# Patient Record
(demographics unavailable — no encounter records)

---

## 2024-12-21 NOTE — ED.PDOC
SOB-HPI


HPI Comments


HPI:


Poor Historian.


65-year-old female presents to the emergency department for evaluation of one-

week history of cough congestion and shortness of breath.  Patient has history 

of COPD oxygen dependent 2 L nasal cannula at home.  Patient states that she is 

not feeling well and having associated generalized body aches.  She took some 

Tylenol prior to arrival.  Denies any other acute symptoms.  Patient states 

having some chest discomfort from coughing frequently.





Initial Vital Signs:


Temp : 98.6F


BP: 157/101


HR: 129


RR: 18


SpO2: 87% on RA





Past Medical History:


Breast Cancer, Asthma, COPD, HTN, DM, GERD, HLD, PE





Past Surgical History:


, Tonsillectomy, Cholecystectomy





Social History:


Denies smoking, ETOH, or drug use.





Allergies:


Carisoprodol, Ceftriaxone, Oxycodone, Penicillins








REVIEW OF SYSTEMS:





CONSTITUTIONAL:





Denies acute: fever, diaphoresis, chills, 





HEAD:


Denies acute:  headache, photophobia





Eyes:


Denies acute: Double vision, vision loss, eye pain, eye discharge.





EARS: 


Denies acute: tinnitus, hearing loss, ear discharge, ear pain,





THROAT: 


Denies acute: sore throat, swelling, difficulty swallowing , pain with 

swallowing, change in  voice.





NECK:


Denies acute: neck pain, neck swelling, stiff neck.





HEART:


Denies acute : chest pain, palpitations,


 


LUNGS:


Denies acute:       hemoptysis





ABDOMEN:


Denies acute: abdominal pain, Nausea, Vomiting, diarrhea, melena , hematemesis, 

hematochezia





SKIN:


Denies acute: rash, redness, lesions, itchiness. 





EXTREMITIES:


Denies acute: calf pain, numbness, tingling, weakness, denies pain in extremity.


Denies acute: Low back pain. 





Neuro:


Denies acute: focal neurological deficit, motor or sensory focal neurological 

deficit, tremors, seizure like activity, confusion, dizziness, change in mental 

status, loss of bowel or bladder function, cauda equina like symptoms.  





: 


Denies acute: dysuria, hematuria, flank pain, increase in urinary frequency.











PSYCH: 


Denies acute: hallucination, suicidal ideation, homicidal ideation.





FEMALE: 


Denies acute:  abnormal vaginal bleeding, foul odor, unusual discharge.  








PHYSICAL EXAM:


General: no acute distress, awake and alert.





Head: normocephalic, atraumatic.





Neck: 


supple, trachea is midline, no swelling. 








Throat:  Normal phonation.








Eyes:, no erythema, no purulent discharge, no proptosis, no icterus.


   


 


Heart:  regular rate, regular rhythm, no significant murmur appreciated.





Lungs:  Mild-to-moderate   respiratory distress, 


   Able to speak in full sentences. 


Noted bilateral wheezing and rhonchi, no crackles.  No stridors


    





Abdomen: non tender to palpation, non distended, soft, no guarding, no rebound, 

+ bowel sounds.











Neuro: Awake, Alert, oriented to name, self, situation, follows commands


 GCS=15.  


Speech is normal.





   


Skin: no petechia, no purpura, no cyanosis, non-pale, not jaundice. 





Lower extremities:  --no - Pitting edema


no deformity, no focal swelling, no calf TTP. 





Makes eye contact.





moves all four extremities.  


   





Face: no apparent facial droop.





 


Ambulating in the ED independently.


Time Seen by MD:  14:41


Primary Care Provider:  [id number]


Reviewed notes:  Nurses Notes, Allergies


Information Source:  Patient





Past Medical History


PAST MEDICAL HISTORY:  Asthma, Cancer, COPD, DM, GERD, High Lipids, HTN, PE


Surgical History:  Cholecystectomy, , Tonsillectomy


GYN History:  No Pertinent GYN History





Family History


Family History:  Reviewed,noncontributory to illness





Social History


Smoker:  Non-Smoker


Alcohol:  Denies ETOH Use


Drugs:  Denies Drug Use


Lives In:  Home





X-Ray, Labs, Meds, VS





                                   Vital Signs








  Date Time  Temp Pulse Resp B/P (MAP) Pulse Ox O2 Delivery O2 Flow Rate FiO2


 


24 19:45 98.9 110 20 154/96 (115) 96   





 98.9       


 


24 18:15  110 20  93 Nasal Cannula 2.0 


 


24 18:15 98.9 110 20 144/84 (104) 96   





 98.9       


 


24 18:11 98.4       


 


24 17:15 99.4 118 20 154/96 (115) 95   





 99.4       


 


24 15:13   26  92 Nasal Cannula* 2 28


 


24 15:11  122      


 


24 14:55 98.6 129 18 157/101 (119) 87   








                                       Lab








Test


 24


18:06 24


16:52 24


15:09 Range/Units


 


 


Influenza Type A Antigen Negative    Negative  


 


Influenza Type B Antigen Negative    Negative  


 


SARS-CoV-2 Antigen (Rapid) Negative    NEGATIVE  


 


Troponin I High Sensitivity  < 3 L < 3 L </=34  ng/L


 


White Blood Count


 


 


 19.1 H


 4.4-10.8


10^3/uL


 


Red Blood Count


 


 


 5.37 H


 4.0-5.20


10^6/uL


 


Hemoglobin   16.3 H 12.2-16.2  g/dL


 


Hematocrit   48.6 H 36.0-46.0  %


 


Mean Corpuscular Volume   90.5  80.0-100.0  fL


 


Mean Corpuscular Hemoglobin   30.4  28.0-32.0  pg


 


Mean Corpuscular Hemoglobin


Concent 


 


 33.6 


 32.0-36.0  g/dL





 


Red Cell Distribution Width   13.5  11.8-14.3  %


 


Platelet Count


 


 


 340 


 140-450


10^3/uL


 


Mean Platelet Volume   8.1  6.9-10.8  fL


 


Neutrophils (%) (Auto)   82.2 H 37.0-80.0  %


 


Lymphocytes (%) (Auto)   10.1  10.0-50.0  %


 


Monocytes (%) (Auto)   5.5  0.0-12.0  %


 


Eosinophils (%) (Auto)   1.7  0.0-7.0  %


 


Basophils (%) (Auto)   0.5  0.0-2.0  %


 


Neutrophils # (Auto)


 


 


 15.7 H


 1.6-8.6  10


^3/uL


 


Lymphocytes # (Auto)


 


 


 1.9 


 0.4-5.4  10


^3/uL


 


Monocytes # (Auto)   1.1  0-1.3  10 ^3/uL


 


Eosinophils # (Auto)   0.3  0-0.8  10 ^3/uL


 


Basophils # (Auto)   0.1  0-0.2  10 ^3/uL


 


Nucleated Red Blood Cells   0.0   %


 


Sodium Level   135 L 136-145  mmol/L


 


Potassium Level   3.9  3.5-5.1  mmol/L


 


Chloride Level   102    mmol/L


 


Carbon Dioxide Level   24  20-31  mmol/L


 


Anion Gap   9  5-15  


 


Blood Urea Nitrogen   8 L 9-23  mg/dL


 


Creatinine


 


 


 0.92 


 0.550-1.02


mg/dL


 


Glomerular Filtration Rate


Calc 


 


 69 


 >90  mL/min





 


BUN/Creatinine Ratio   8.7 L 10.0-20.0  


 


Serum Glucose   193 H   mg/dL


 


Lactic Acid Level   1.4  0.4-2.0  mmol/L


 


Calcium Level   9.9  8.7-10.4  mg/dL


 


Total Bilirubin   1.0  0.2-1.0  mg/dL


 


Aspartate Amino Transferase


(AST) 


 


 17 


 13-40  U/L





 


Alanine Aminotransferase (ALT)   15  7-40  U/L


 


Alkaline Phosphatase   127 H   U/L


 


B-Type Natriuretic Peptide   22.54  0-100  pg/mL


 


Total Protein   8.3 H 5.7-8.2  g/dL


 


Albumin   4.0  3.2-4.8  g/dL








                               Current Medications








 Medications


  (Trade)  Dose


 Ordered  Sig/Dorothea


 Route  Start Time


 Stop Time Status Last Admin


 


 Albuterol


  (Ventolin Medneb)  2.5 mg  ONCE  ONCE


 NEB  24 15:00


 24 15:01 DC 24 15:12





 


 Ipratropium


 Bromide


  (Atrovent Medneb)  1 mg  ONCE  ONCE


 NEB  24 15:00


 24 15:01 PR 24 15:12





 


 Methylprednisolone


 Sodium Succinate


  (Solu Medrol)  125 mg  ONCE  ONCE


 IV  24 15:00


 24 15:01 DC 24 17:29





 


 Acetaminophen


  (Tylenol Tablet)  650 mg  ONCE  ONCE


 PO  24 17:45


 24 17:46 PR 24 18:11





 


 Levofloxacin


  (Levaquin Tablet)  500 mg  ONCE  ONCE


 PO  24 18:00


 24 18:01 PR 24 18:08




















                             [place] [id number]


                              Ph: [phone]





                               DIAGNOSTIC IMAGING


                      Diagnostic Imaging Report : [id number]


                                     Signed








PATIENT: VI DORANTES      ACCT: [id number]        UNIT: [id number]


: 1959           LOC: ER                   ROOM / BED:  / 


AGE / SEX: 65 / F         ADM STATUS: REG ER        SERVICE DT: [id number]





ORDERING PHYSICIAN: TOBIAS BONILLA DO


PROCEDURE(s): CXRP - CHEST PORTABLE


REASON: sob


ORDER NUMBER(s): [id number], ACCESSION NUMBER(s): [id number]








EXAMINATION: AP portable chest radiograph





CLINICAL HISTORY: sob





COMPARISON: CHEST PORTABLE on DOS: 21





TECHNIQUE:  Single AP upright view of the chest





FINDINGS: 





Negative AP chest.





No dominant consolidations. The costophrenic angles are clear. No sizable 

pleural effusions or pneumothorax identified. The cardiomediastinal silhouette 

appears within normal limits given technique.





IMPRESSION: 





1. Negative AP chest.





2. No significant change from 2021





HS:Y 





Electronically Signed by: Dara Rand at 2024 15:32:14 PM











DICTATED BY: DARA RAND Jr., DO


DICTATED DATE/TIME: 24





SIGNED BY: DARA RAND Jr., DO


SIGNED DATE/TIME: 24





CC: 





Time of 1ST Reevaluation:  21:40 (At this time patient and family want to leave 

against medical advice.  They are tired of waiting this long in the lobby.  

There are no beds available at this time in the main ER or upstairs.  They said 

they going to go home and then go to [facility].)


Reevaluation 1ST:  Improved


Patient Education/Counseling:  Diagnosis, Treatment


Family Education/Counseling:  Diagnosis, Treatment





Departure 1


Departure


Time of Disposition:  16:44


Impression:  


   Primary Impression:  


   Hypoxemia


   Additional Impressions:  


   Respiratory distress


   Cough


   COPD exacerbation


   Left against medical advice


Disposition:   ADMITTED AS INPATIENT


Admit to:  [facility]


Condition:  Guarded





Additional Instructions:  


You are leaving against medical advice.  Please seek medical attention ASAP.  

Please return to the emergency department if you change your mind.





Follow up with pulmonology and Cardiology ASAP.  Follow up with your PCP ASAP.


Discharged With:  Self, Relative








I personally scribed for TOBIAS BONILLA DO ([email or web address]) on 24 at 16:14.  

Electronically submitted by Danny Ferrara ([email or web address]).


I personally scribed for TOBIAS BONILLA DO ([email or web address]) on 24 at 18:48.  

Electronically submitted by Bonny Wick ([email or web address]).


I personally scribed for TOBIAS BONILLA DO ([email or web address]) on 24 at 19:12.  

Electronically submitted by Bonny Wick ([email or web address]).





TOBIAS BONILLA DO                Dec 21, 2024 15:04

## 2024-12-21 NOTE — DV[facility]
EXAMINATION: AP portable chest radiograph



CLINICAL HISTORY: sob



COMPARISON: CHEST PORTABLE on DOS: 2/24/21



TECHNIQUE:  Single AP upright view of the chest



FINDINGS: 



Negative AP chest.



No dominant consolidations. The costophrenic angles are clear. No sizable pleural effusions or pneumo
thorax identified. The cardiomediastinal silhouette appears within normal limits given technique.



IMPRESSION: 



1. Negative AP chest.



2. No significant change from 02/24/2021



HS:Y 



Electronically Signed by: Anjel Marin at 12/21/2024 15:32:14 PM

## 2024-12-23 NOTE — ECG
[facility]

                                       

Test Date:    2024               Test Time:    15:11:11

Pat Name:     VI DORANTES              Department:   ER

Patient ID:   [id number]           Room:          

Gender:       F                        Technician:   JOHANNA

:          1959               Requested By: TOBIAS BONILLA

Order Number: [id number]        Reading MD:   Bharat Aceves

                                 Measurements

Intervals                              Axis          

Rate:         122                      P:            84

NY:           128                      QRS:          82

QRSD:         80                       T:            75

QT:           313                                    

QTc:          446                                    

                           Interpretive Statements

Sinus tachycardia

Consider right atrial enlargement

Borderline right axis deviation

Minimal ST depression, inferior leads

Electronically Signed On 2024 13:05:02 PST by Bharat Aceves



Please click the below link to view image of tracing.